# Patient Record
Sex: FEMALE | Race: WHITE | Employment: FULL TIME | URBAN - METROPOLITAN AREA
[De-identification: names, ages, dates, MRNs, and addresses within clinical notes are randomized per-mention and may not be internally consistent; named-entity substitution may affect disease eponyms.]

---

## 2023-07-31 ENCOUNTER — OFFICE VISIT (OUTPATIENT)
Dept: OCCUPATIONAL MEDICINE | Age: 53
End: 2023-07-31

## 2023-07-31 VITALS
RESPIRATION RATE: 18 BRPM | BODY MASS INDEX: 26.05 KG/M2 | WEIGHT: 147 LBS | OXYGEN SATURATION: 94 % | SYSTOLIC BLOOD PRESSURE: 128 MMHG | HEART RATE: 75 BPM | TEMPERATURE: 98.4 F | DIASTOLIC BLOOD PRESSURE: 80 MMHG | HEIGHT: 63 IN

## 2023-07-31 DIAGNOSIS — R52 BODY ACHES: Primary | ICD-10-CM

## 2023-07-31 DIAGNOSIS — J02.9 SORE THROAT: ICD-10-CM

## 2023-07-31 DIAGNOSIS — J01.10 ACUTE NON-RECURRENT FRONTAL SINUSITIS: ICD-10-CM

## 2023-07-31 PROBLEM — Z86.16 HISTORY OF COVID-19: Status: ACTIVE | Noted: 2023-07-31

## 2023-07-31 LAB
GROUP A STREP ANTIGEN, POC: NEGATIVE
SARS-COV-2, POC: NORMAL
VALID INTERNAL CONTROL, POC: YES

## 2023-07-31 RX ORDER — ESCITALOPRAM OXALATE 20 MG/1
20 TABLET ORAL DAILY
COMMUNITY
Start: 2016-05-10

## 2023-07-31 RX ORDER — AMOXICILLIN AND CLAVULANATE POTASSIUM 875; 125 MG/1; MG/1
1 TABLET, FILM COATED ORAL 2 TIMES DAILY
Qty: 20 TABLET | Refills: 0
Start: 2023-07-31 | End: 2023-08-10

## 2023-07-31 ASSESSMENT — ENCOUNTER SYMPTOMS
COUGH: 0
DIARRHEA: 0
VOICE CHANGE: 0
SINUS PAIN: 1
VOMITING: 0
SHORTNESS OF BREATH: 0
ABDOMINAL PAIN: 0
NAUSEA: 0
TROUBLE SWALLOWING: 0
SINUS PRESSURE: 1
SORE THROAT: 1

## 2025-01-22 ENCOUNTER — OFFICE VISIT (OUTPATIENT)
Age: 55
End: 2025-01-22

## 2025-01-22 VITALS
OXYGEN SATURATION: 99 % | DIASTOLIC BLOOD PRESSURE: 84 MMHG | SYSTOLIC BLOOD PRESSURE: 108 MMHG | RESPIRATION RATE: 16 BRPM | HEART RATE: 85 BPM | TEMPERATURE: 97.7 F

## 2025-01-22 DIAGNOSIS — J06.9 VIRAL UPPER RESPIRATORY TRACT INFECTION: Primary | ICD-10-CM

## 2025-01-22 LAB
LOT EXPIRE DATE: 9999
LOT KIT NUMBER: NORMAL
SARS-COV-2, POC: NORMAL
VALID INTERNAL CONTROL: YES
VENDOR AND KIT NAME POC: NORMAL

## 2025-01-22 ASSESSMENT — ENCOUNTER SYMPTOMS
COUGH: 1
VOICE CHANGE: 0
RHINORRHEA: 0
VOMITING: 0
TROUBLE SWALLOWING: 0
DIARRHEA: 0
SORE THROAT: 1
SHORTNESS OF BREATH: 0
NAUSEA: 0
ABDOMINAL PAIN: 0

## 2025-01-22 NOTE — PROGRESS NOTES
[address] [address] [phone]    SUBJECTIVE:     Ricarda Orellana is a 54 y.o. female     Patient presents with report of upper respiratory symptoms which started on 1/20. The most bothersome symptom today is: sore throat Denies rash, neck stiffness, drooling, stridor, lockjaw, inability to eat or swallow, shortness of breath, chest pain, or any other acute complaints.   The patient has treated their symptoms with dayquil, nyquil           Current Outpatient Medications:     escitalopram (LEXAPRO) 20 MG tablet, Take 1 tablet by mouth daily, Disp: , Rfl:      No Known Allergies    Review of Systems   Constitutional:  Negative for chills and fever.   HENT:  Positive for congestion, ear pain and sore throat. Negative for ear discharge, rhinorrhea, trouble swallowing and voice change.    Respiratory:  Positive for cough. Negative for shortness of breath.    Cardiovascular:  Negative for chest pain.   Gastrointestinal:  Negative for abdominal pain, diarrhea, nausea and vomiting.         OBJECTIVE:    /84 (Site: Left Upper Arm, Position: Sitting)   Pulse 85   Temp 97.7 °F (36.5 °C) (Temporal)   Resp 16   SpO2 99%      Physical Exam  Vitals reviewed.   Constitutional:       Appearance: Normal appearance.   HENT:      Head: Normocephalic and atraumatic.      Jaw: No trismus.      Right Ear: Hearing and ear canal normal. A middle ear effusion is present.      Left Ear: Hearing and ear canal normal. A middle ear effusion is present.      Nose: Congestion present.      Mouth/Throat:      Lips: Pink.      Mouth: Mucous membranes are moist.      Pharynx: Uvula midline. Postnasal drip present. No pharyngeal swelling, oropharyngeal exudate or posterior oropharyngeal erythema.      Tonsils: No tonsillar exudate. 0 on the right. 0 on the left.   Cardiovascular:      Rate and Rhythm: Normal rate and regular rhythm.   Pulmonary:      Effort: Pulmonary effort is